# Patient Record
Sex: FEMALE | Race: BLACK OR AFRICAN AMERICAN | NOT HISPANIC OR LATINO | Employment: FULL TIME | URBAN - METROPOLITAN AREA
[De-identification: names, ages, dates, MRNs, and addresses within clinical notes are randomized per-mention and may not be internally consistent; named-entity substitution may affect disease eponyms.]

---

## 2023-09-26 ENCOUNTER — TELEPHONE (OUTPATIENT)
Dept: DERMATOLOGY | Facility: CLINIC | Age: 48
End: 2023-09-26
Payer: OTHER GOVERNMENT

## 2023-09-26 NOTE — TELEPHONE ENCOUNTER
Returned call. Pt scheduled for next available with Dr. Borjas in November.  Pt added to waitlist. Pt verbalized understanding   ----- Message from Anju Hawthorne RN sent at 9/25/2023  5:18 PM CDT -----    ----- Message -----  From: Laura Pascal  Sent: 9/25/2023   2:44 PM CDT  To: [facility] Staff    Patient called back after missing a call from your office. Please call back [phone]. Thanks tpw